# Patient Record
Sex: FEMALE | URBAN - METROPOLITAN AREA
[De-identification: names, ages, dates, MRNs, and addresses within clinical notes are randomized per-mention and may not be internally consistent; named-entity substitution may affect disease eponyms.]

---

## 2023-10-25 ENCOUNTER — OFFICE VISIT (OUTPATIENT)
Dept: URBAN - METROPOLITAN AREA CLINIC 54 | Facility: CLINIC | Age: 68
End: 2023-10-25
Payer: COMMERCIAL

## 2023-10-25 VITALS
BODY MASS INDEX: 28.42 KG/M2 | HEIGHT: 66 IN | DIASTOLIC BLOOD PRESSURE: 67 MMHG | WEIGHT: 176.8 LBS | HEART RATE: 73 BPM | SYSTOLIC BLOOD PRESSURE: 116 MMHG | TEMPERATURE: 97.9 F

## 2023-10-25 DIAGNOSIS — R74.8 ELEVATED LIVER ENZYMES: ICD-10-CM

## 2023-10-25 DIAGNOSIS — Z86.010 HISTORY OF COLON POLYPS: ICD-10-CM

## 2023-10-25 DIAGNOSIS — D69.6 THROMBOCYTOPENIA: ICD-10-CM

## 2023-10-25 DIAGNOSIS — R14.0 ABDOMINAL DISTENSION: ICD-10-CM

## 2023-10-25 DIAGNOSIS — R60.0 BILATERAL LEG EDEMA: ICD-10-CM

## 2023-10-25 PROCEDURE — 99204 OFFICE O/P NEW MOD 45 MIN: CPT

## 2023-10-25 RX ORDER — FLUOXETINE 20 MG/1
1 CAPSULE CAPSULE ORAL ONCE A DAY
Status: ACTIVE | COMMUNITY

## 2023-10-25 RX ORDER — LEVOTHYROXINE SODIUM 75 UG/1
1 TABLET IN THE MORNING ON AN EMPTY STOMACH TABLET ORAL ONCE A DAY
Status: ACTIVE | COMMUNITY

## 2023-10-25 RX ORDER — OXYMETAZOLINE HYDROCHLORIDE 0.05 MG/100ML
4 SPRAYS (2 SPRAYS IN EACH NOSTRIL) SPRAY, METERED NASAL TWICE A DAY
Status: ACTIVE | COMMUNITY

## 2023-10-25 NOTE — PHYSICAL EXAM CONSTITUTIONAL:
well developed, well nourished , in no acute distress , ambulating without difficulty , slight slurring of words

## 2023-10-25 NOTE — HPI-TODAY'S VISIT:
10/25/23: Patient is a 68 female with a PMH of HTN, DM2, hypothyroidism, and colon polyps who was self referred for colon cancer screening. Last colonoscopy was 5 years ago. Maternal aunt had colon cancer. Pt denies any abdominal pain, change in bowel habits, diarrhea, constipation, rectal bleeding, or melena, but she does complain of significant abd bloating and distension over the past few weeks. States her belly feels very swollen. Pt has also develoeped new LE edema in the last few weeks. Admits she has been having some confusion in the last 2 months along with word-finding issues. No jaundice. Pt denies any personal hx of liver disease, but was recently told her "liver was high," labs below, so she has a RUQ US schedule this Friday. No family hx of liver disease. Pt denies any current alcohol use or hx of heavy etoh consumption. No cardipulmonary disease. ER visit last month for low BP on home cuff, but BP was normal at ER with clear CXR.    Labs 9/18/23: AST 49, ALT 28, , TB 0.6, albumin 3.6, Cr 0.7, Na 136, K 3.3, Plt 120, Hgb 12.4, MCV 91, WBC 3.5, INR 1.13.

## 2023-10-28 PROBLEM — 428283002: Status: ACTIVE | Noted: 2023-10-28

## 2023-10-28 PROBLEM — 302215000: Status: ACTIVE | Noted: 2023-10-28

## 2023-10-30 ENCOUNTER — TELEPHONE ENCOUNTER (OUTPATIENT)
Dept: URBAN - METROPOLITAN AREA CLINIC 54 | Facility: CLINIC | Age: 68
End: 2023-10-30

## 2023-10-31 ENCOUNTER — TELEPHONE ENCOUNTER (OUTPATIENT)
Dept: URBAN - METROPOLITAN AREA CLINIC 54 | Facility: CLINIC | Age: 68
End: 2023-10-31

## 2023-10-31 LAB
A/G RATIO: 1.6
ABSOLUTE BASOPHILS: 22
ABSOLUTE EOSINOPHILS: 29
ABSOLUTE LYMPHOCYTES: 731
ABSOLUTE MONOCYTES: 382
ABSOLUTE NEUTROPHILS: 2437
ACTIN (SMOOTH MUSCLE) ANTIBODY (IGG): <20
ALBUMIN: 3.5
ALKALINE PHOSPHATASE: 161
ALPHA-1-ANTITRYPSIN QN: 145
ALT (SGPT): 22
AMMONIA (P): 43
ANA SCREEN, IFA: NEGATIVE
AST (SGOT): 31
BASOPHILS: 0.6
BILIRUBIN, TOTAL: 1.3
BUN/CREATININE RATIO: (no result)
BUN: 9
CALCIUM: 9
CARBON DIOXIDE, TOTAL: 26
CERULOPLASMIN: 30
CHLORIDE: 105
CREATININE: 0.82
EGFR: 77
EOSINOPHILS: 0.8
FERRITIN, SERUM: 79
GLOBULIN, TOTAL: 2.2
GLUCOSE: 123
HBSAG SCREEN: (no result)
HEMATOCRIT: 37.6
HEMOGLOBIN: 12.2
HEP A AB, IGM: (no result)
HEP B CORE AB, IGM: (no result)
HEPATITIS C ANTIBODY: (no result)
IMMUNOGLOBULIN A: 352
IMMUNOGLOBULIN G: 1012
IMMUNOGLOBULIN M: 155
INR: 1.1
IRON BIND.CAP.(TIBC): 247
IRON SATURATION: 30
IRON: 75
LYMPHOCYTES: 20.3
MCH: 29.3
MCHC: 32.4
MCV: 90.2
MITOCHONDRIAL (M2) ANTIBODY: <=20
MONOCYTES: 10.6
MPV: 13.4
NEUTROPHILS: 67.7
PLATELET COUNT: 137
POTASSIUM: 3.8
PROTEIN, TOTAL: 5.7
PT: 11.5
RDW: 14.8
RED BLOOD CELL COUNT: 4.17
RHEUMATOID FACTOR: <14
SJOGREN'S ANTIBODY (SS-A): (no result)
SJOGREN'S ANTIBODY (SS-B): (no result)
SODIUM: 140
WHITE BLOOD CELL COUNT: 3.6

## 2023-11-03 ENCOUNTER — TELEPHONE ENCOUNTER (OUTPATIENT)
Dept: URBAN - METROPOLITAN AREA CLINIC 54 | Facility: CLINIC | Age: 68
End: 2023-11-03

## 2023-11-06 ENCOUNTER — LAB OUTSIDE AN ENCOUNTER (OUTPATIENT)
Dept: URBAN - METROPOLITAN AREA CLINIC 54 | Facility: CLINIC | Age: 68
End: 2023-11-06

## 2023-11-06 ENCOUNTER — TELEPHONE ENCOUNTER (OUTPATIENT)
Dept: URBAN - METROPOLITAN AREA CLINIC 54 | Facility: CLINIC | Age: 68
End: 2023-11-06

## 2023-11-06 PROBLEM — 19943007: Status: ACTIVE | Noted: 2023-11-06

## 2023-11-06 PROBLEM — 389026000: Status: ACTIVE | Noted: 2023-11-06

## 2023-11-06 RX ORDER — LACTULOSE 10 G/15ML
15 ML AS NEEDED SOLUTION ORAL TWICE A DAY
Qty: 900 ML | Refills: 3 | OUTPATIENT
Start: 2023-11-06 | End: 2024-03-05

## 2023-11-06 RX ORDER — FUROSEMIDE 40 MG/1
1 TABLET TABLET ORAL ONCE A DAY
Qty: 30 | Refills: 2 | OUTPATIENT
Start: 2023-11-06

## 2023-11-06 RX ORDER — SPIRONOLACTONE 100 MG/1
1 TABLET TABLET, FILM COATED ORAL ONCE A DAY
Qty: 30 | Refills: 2 | OUTPATIENT
Start: 2023-11-06 | End: 2024-02-04

## 2023-11-13 ENCOUNTER — LAB OUTSIDE AN ENCOUNTER (OUTPATIENT)
Dept: URBAN - METROPOLITAN AREA CLINIC 54 | Facility: CLINIC | Age: 68
End: 2023-11-13

## 2023-11-27 ENCOUNTER — OFFICE VISIT (OUTPATIENT)
Dept: URBAN - METROPOLITAN AREA CLINIC 54 | Facility: CLINIC | Age: 68
End: 2023-11-27
Payer: COMMERCIAL

## 2023-11-27 VITALS
DIASTOLIC BLOOD PRESSURE: 64 MMHG | SYSTOLIC BLOOD PRESSURE: 97 MMHG | BODY MASS INDEX: 23.95 KG/M2 | HEART RATE: 78 BPM | TEMPERATURE: 97.7 F | WEIGHT: 149 LBS | HEIGHT: 66 IN

## 2023-11-27 DIAGNOSIS — K86.9 PANCREATIC LESION: ICD-10-CM

## 2023-11-27 DIAGNOSIS — K76.82 HEPATIC ENCEPHALOPATHY: ICD-10-CM

## 2023-11-27 DIAGNOSIS — R18.8 OTHER ASCITES: ICD-10-CM

## 2023-11-27 DIAGNOSIS — K74.60 UNSPECIFIED CIRRHOSIS OF LIVER: ICD-10-CM

## 2023-11-27 PROCEDURE — 99214 OFFICE O/P EST MOD 30 MIN: CPT

## 2023-11-27 RX ORDER — LACTULOSE 10 G/15ML
15 ML AS NEEDED SOLUTION ORAL TWICE A DAY
Qty: 900 ML | Refills: 3 | Status: ACTIVE | COMMUNITY
Start: 2023-11-06 | End: 2024-03-05

## 2023-11-27 RX ORDER — SPIRONOLACTONE 100 MG/1
1 TABLET TABLET, FILM COATED ORAL ONCE A DAY
Qty: 30 | Refills: 2 | Status: ACTIVE | COMMUNITY
Start: 2023-11-06 | End: 2024-02-04

## 2023-11-27 RX ORDER — LEVOTHYROXINE SODIUM 75 UG/1
1 TABLET IN THE MORNING ON AN EMPTY STOMACH TABLET ORAL ONCE A DAY
Status: ACTIVE | COMMUNITY

## 2023-11-27 RX ORDER — FLUOXETINE 20 MG/1
1 CAPSULE CAPSULE ORAL ONCE A DAY
Status: ACTIVE | COMMUNITY

## 2023-11-27 RX ORDER — SPIRONOLACTONE 100 MG/1
1 TABLET TABLET, FILM COATED ORAL ONCE A DAY
OUTPATIENT
Start: 2023-11-06 | End: 2024-02-04

## 2023-11-27 RX ORDER — LACTULOSE 10 G/15ML
30 ML SOLUTION ORAL
OUTPATIENT
Start: 2023-11-06 | End: 2023-12-27

## 2023-11-27 RX ORDER — FUROSEMIDE 20 MG/1
1 TABLET TABLET ORAL ONCE A DAY
Qty: 30 | Refills: 2 | OUTPATIENT
Start: 2023-11-27

## 2023-11-27 RX ORDER — FUROSEMIDE 40 MG/1
1 TABLET TABLET ORAL ONCE A DAY
Qty: 30 | Refills: 2 | Status: ACTIVE | COMMUNITY
Start: 2023-11-06

## 2023-11-27 RX ORDER — FUROSEMIDE 40 MG/1
1 TABLET TABLET ORAL ONCE A DAY
OUTPATIENT
Start: 2023-11-06

## 2023-11-27 RX ORDER — SPIRONOLACTONE 50 MG/1
1 TABLET TABLET, FILM COATED ORAL ONCE A DAY
Qty: 30 | Refills: 2 | OUTPATIENT
Start: 2023-11-27 | End: 2024-02-25

## 2023-11-27 RX ORDER — OXYMETAZOLINE HYDROCHLORIDE 0.05 MG/100ML
4 SPRAYS (2 SPRAYS IN EACH NOSTRIL) SPRAY, METERED NASAL TWICE A DAY
Status: ACTIVE | COMMUNITY

## 2023-11-27 NOTE — HPI-TODAY'S VISIT:
10/25/23: Patient is a 68 female with a PMH of HTN, DM2, hypothyroidism, and colon polyps who was self referred for colon cancer screening. Last colonoscopy was 5 years ago. Maternal aunt had colon cancer. Pt denies any abdominal pain, change in bowel habits, diarrhea, constipation, rectal bleeding, or melena, but she does complain of significant abd bloating and distension over the past few weeks. States her belly feels very swollen. Pt has also develoeped new LE edema in the last few weeks. Admits she has been having some confusion in the last 2 months along with word-finding issues. No jaundice. Pt denies any personal hx of liver disease, but was recently told her "liver was high," labs below, so she has a RUQ US schedule this Friday. No family hx of liver disease. Pt denies any current alcohol use or hx of heavy etoh consumption. No cardipulmonary disease. ER visit last month for low BP on home cuff, but BP was normal at ER with clear CXR.    Labs 9/18/23: AST 49, ALT 28, , TB 0.6, albumin 3.6, Cr 0.7, Na 136, K 3.3, Plt 120, Hgb 12.4, MCV 91, WBC 3.5, INR 1.13.  11/27/23: Pt returns for follow up following labs and US which revealed cirrhosis with small volume ascites. Negative secondary workup, likely secondary to NAFLD. MELD 3.0 was 9. Pt was started on A100, L40 and ascites and LE edema has resolved. However pt has lost 28 lbs in the last month (+3 lbs today). Taking lactulose 15mL BID but only having a BM every few days. Continues to have confusion and word finding issues. Complains of significant fatigue. Has MRI/MRCP scheduled next week to evaluate pancreatic head lesion. Due for cscope, needs EGD.   RUQ US 11/6/2023:  - Cirrhotic hepatic morphology with small volume abdominal ascites. - Cystic lesion in the region of the pancreatic head measuring up to 1.8 cm. MRCP could better evaluate.

## 2023-11-28 ENCOUNTER — TELEPHONE ENCOUNTER (OUTPATIENT)
Dept: URBAN - METROPOLITAN AREA CLINIC 54 | Facility: CLINIC | Age: 68
End: 2023-11-28

## 2023-11-28 LAB
AFP, SERUM, TUMOR MARKER: 6.1
BUN/CREATININE RATIO: 20
CALCIUM: 10
CARBON DIOXIDE: 28
CHLORIDE: 98
CREATININE: 1.02
EGFR: 59
GLUCOSE: 98
POTASSIUM: 4
SODIUM: 135
UREA NITROGEN (BUN): 20

## 2023-12-01 ENCOUNTER — ERX REFILL RESPONSE (OUTPATIENT)
Dept: URBAN - METROPOLITAN AREA CLINIC 54 | Facility: CLINIC | Age: 68
End: 2023-12-01

## 2023-12-01 RX ORDER — FUROSEMIDE 40 MG/1
TAKE 1 TABLET BY MOUTH EVERY DAY FOR 30 DAYS TABLET ORAL
Qty: 90 TABLET | Refills: 1 | OUTPATIENT

## 2023-12-12 ENCOUNTER — TELEPHONE ENCOUNTER (OUTPATIENT)
Dept: URBAN - METROPOLITAN AREA CLINIC 54 | Facility: CLINIC | Age: 68
End: 2023-12-12

## 2023-12-13 ENCOUNTER — TELEPHONE ENCOUNTER (OUTPATIENT)
Dept: URBAN - METROPOLITAN AREA CLINIC 54 | Facility: CLINIC | Age: 68
End: 2023-12-13

## 2023-12-14 ENCOUNTER — LAB OUTSIDE AN ENCOUNTER (OUTPATIENT)
Dept: URBAN - METROPOLITAN AREA CLINIC 54 | Facility: CLINIC | Age: 68
End: 2023-12-14

## 2023-12-14 ENCOUNTER — TELEPHONE ENCOUNTER (OUTPATIENT)
Dept: URBAN - METROPOLITAN AREA CLINIC 54 | Facility: CLINIC | Age: 68
End: 2023-12-14

## 2023-12-14 RX ORDER — SODIUM, POTASSIUM,MAG SULFATES 17.5-3.13G
354 ML AS DIRECTED SOLUTION, RECONSTITUTED, ORAL ORAL
Qty: 354 ML | Refills: 0 | OUTPATIENT
Start: 2023-12-14 | End: 2023-12-15

## 2023-12-19 ENCOUNTER — ERX REFILL RESPONSE (OUTPATIENT)
Dept: URBAN - METROPOLITAN AREA CLINIC 54 | Facility: CLINIC | Age: 68
End: 2023-12-19

## 2023-12-19 RX ORDER — LACTULOSE 10 G/15ML
30 ML SOLUTION ORAL
OUTPATIENT

## 2023-12-19 RX ORDER — LACTULOSE 10 G/15ML
30 ML SOLUTION ORAL
Qty: 1800 | Refills: 5 | OUTPATIENT

## 2023-12-21 ENCOUNTER — ERX REFILL RESPONSE (OUTPATIENT)
Dept: URBAN - METROPOLITAN AREA CLINIC 54 | Facility: CLINIC | Age: 68
End: 2023-12-21

## 2023-12-21 RX ORDER — FUROSEMIDE 20 MG/1
TAKE 1 TABLET BY MOUTH EVERY DAY FOR 30 DAYS TABLET ORAL
Qty: 90 TABLET | Refills: 1 | OUTPATIENT

## 2023-12-21 RX ORDER — SPIRONOLACTONE 50 MG/1
TAKE 1 TABLET BY MOUTH EVERY DAY FOR 30 DAYS TABLET ORAL
Qty: 90 TABLET | Refills: 1 | OUTPATIENT

## 2023-12-21 RX ORDER — SPIRONOLACTONE 50 MG/1
1 TABLET TABLET, FILM COATED ORAL ONCE A DAY
Qty: 30 | Refills: 2 | OUTPATIENT

## 2023-12-21 RX ORDER — FUROSEMIDE 20 MG/1
1 TABLET TABLET ORAL ONCE A DAY
Qty: 30 | Refills: 2 | OUTPATIENT

## 2023-12-22 LAB — CA 19-9: 29

## 2023-12-27 ENCOUNTER — TELEPHONE ENCOUNTER (OUTPATIENT)
Dept: URBAN - METROPOLITAN AREA CLINIC 54 | Facility: CLINIC | Age: 68
End: 2023-12-27

## 2024-01-02 ENCOUNTER — TELEPHONE ENCOUNTER (OUTPATIENT)
Dept: URBAN - METROPOLITAN AREA CLINIC 54 | Facility: CLINIC | Age: 69
End: 2024-01-02

## 2024-03-27 ENCOUNTER — COL/EGD (OUTPATIENT)
Dept: URBAN - METROPOLITAN AREA MEDICAL CENTER 24 | Facility: MEDICAL CENTER | Age: 69
End: 2024-03-27

## 2024-03-27 RX ORDER — OXYMETAZOLINE HYDROCHLORIDE 0.05 MG/100ML
4 SPRAYS (2 SPRAYS IN EACH NOSTRIL) SPRAY, METERED NASAL TWICE A DAY
Status: ACTIVE | COMMUNITY

## 2024-03-27 RX ORDER — LACTULOSE 10 G/15ML
30 ML SOLUTION ORAL
Qty: 1800 | Refills: 5 | Status: ACTIVE | COMMUNITY

## 2024-03-27 RX ORDER — FUROSEMIDE 40 MG/1
TAKE 1 TABLET BY MOUTH EVERY DAY FOR 30 DAYS TABLET ORAL
Qty: 90 TABLET | Refills: 1 | Status: ACTIVE | COMMUNITY

## 2024-03-27 RX ORDER — FUROSEMIDE 20 MG/1
TAKE 1 TABLET BY MOUTH EVERY DAY FOR 30 DAYS TABLET ORAL
Qty: 90 TABLET | Refills: 1 | Status: ACTIVE | COMMUNITY

## 2024-03-27 RX ORDER — SPIRONOLACTONE 50 MG/1
TAKE 1 TABLET BY MOUTH EVERY DAY FOR 30 DAYS TABLET ORAL
Qty: 90 TABLET | Refills: 1 | Status: ACTIVE | COMMUNITY

## 2024-03-27 RX ORDER — LEVOTHYROXINE SODIUM 75 UG/1
1 TABLET IN THE MORNING ON AN EMPTY STOMACH TABLET ORAL ONCE A DAY
Status: ACTIVE | COMMUNITY

## 2024-03-27 RX ORDER — FLUOXETINE 20 MG/1
1 CAPSULE CAPSULE ORAL ONCE A DAY
Status: ACTIVE | COMMUNITY

## 2024-04-16 ENCOUNTER — LAB (OUTPATIENT)
Dept: URBAN - METROPOLITAN AREA CLINIC 54 | Facility: CLINIC | Age: 69
End: 2024-04-16

## 2024-04-16 ENCOUNTER — OV EP (OUTPATIENT)
Dept: URBAN - METROPOLITAN AREA CLINIC 54 | Facility: CLINIC | Age: 69
End: 2024-04-16
Payer: COMMERCIAL

## 2024-04-16 VITALS
TEMPERATURE: 97.3 F | BODY MASS INDEX: 25.23 KG/M2 | HEIGHT: 66 IN | HEART RATE: 66 BPM | WEIGHT: 157 LBS | DIASTOLIC BLOOD PRESSURE: 64 MMHG | SYSTOLIC BLOOD PRESSURE: 94 MMHG

## 2024-04-16 DIAGNOSIS — Z86.010 PERSONAL HISTORY OF COLONIC POLYPS: ICD-10-CM

## 2024-04-16 DIAGNOSIS — K59.00 CONSTIPATION, UNSPECIFIED CONSTIPATION TYPE: ICD-10-CM

## 2024-04-16 DIAGNOSIS — K76.82 HEPATIC ENCEPHALOPATHY: ICD-10-CM

## 2024-04-16 DIAGNOSIS — K76.6 PORTAL HYPERTENSION: ICD-10-CM

## 2024-04-16 DIAGNOSIS — R18.8 OTHER ASCITES: ICD-10-CM

## 2024-04-16 DIAGNOSIS — K86.9 PANCREATIC LESION: ICD-10-CM

## 2024-04-16 DIAGNOSIS — I85.00 ESOPHAGEAL VARICES WITHOUT BLEEDING, UNSPECIFIED ESOPHAGEAL VARICES TYPE: ICD-10-CM

## 2024-04-16 DIAGNOSIS — K74.60 UNSPECIFIED CIRRHOSIS OF LIVER: ICD-10-CM

## 2024-04-16 PROBLEM — 14223005: Status: ACTIVE | Noted: 2024-04-16

## 2024-04-16 PROBLEM — 34742003: Status: ACTIVE | Noted: 2024-04-16

## 2024-04-16 PROBLEM — 14760008: Status: ACTIVE | Noted: 2024-04-16

## 2024-04-16 PROBLEM — 428283002: Status: ACTIVE | Noted: 2024-04-16

## 2024-04-16 PROCEDURE — 99214 OFFICE O/P EST MOD 30 MIN: CPT | Performed by: PHYSICIAN ASSISTANT

## 2024-04-16 RX ORDER — CARVEDILOL 3.12 MG/1
1 TABLET WITH FOOD TABLET, FILM COATED ORAL TWICE A DAY
Qty: 60 | Refills: 3 | Status: ACTIVE | COMMUNITY
Start: 2024-03-27

## 2024-04-16 RX ORDER — FUROSEMIDE 20 MG/1
TAKE 1 TABLET BY MOUTH EVERY DAY FOR 30 DAYS TABLET ORAL
Qty: 90 TABLET | Refills: 1 | Status: ACTIVE | COMMUNITY

## 2024-04-16 RX ORDER — SPIRONOLACTONE 50 MG/1
TAKE 1 TABLET BY MOUTH EVERY DAY FOR 30 DAYS TABLET ORAL
Qty: 90 TABLET | Refills: 1

## 2024-04-16 RX ORDER — LEVOTHYROXINE SODIUM 75 UG/1
1 TABLET IN THE MORNING ON AN EMPTY STOMACH TABLET ORAL ONCE A DAY
Status: ACTIVE | COMMUNITY

## 2024-04-16 RX ORDER — LACTULOSE 10 G/15ML
30 ML SOLUTION ORAL
Qty: 1800 | Refills: 5 | Status: ACTIVE | COMMUNITY

## 2024-04-16 RX ORDER — SPIRONOLACTONE 50 MG/1
TAKE 1 TABLET BY MOUTH EVERY DAY FOR 30 DAYS TABLET ORAL
Qty: 90 TABLET | Refills: 1 | Status: ACTIVE | COMMUNITY

## 2024-04-16 RX ORDER — OXYMETAZOLINE HYDROCHLORIDE 0.05 MG/100ML
4 SPRAYS (2 SPRAYS IN EACH NOSTRIL) SPRAY, METERED NASAL TWICE A DAY
Status: ACTIVE | COMMUNITY

## 2024-04-16 RX ORDER — FUROSEMIDE 40 MG/1
TAKE 1 TABLET BY MOUTH EVERY DAY FOR 30 DAYS TABLET ORAL
Qty: 90 TABLET | Refills: 1 | Status: ACTIVE | COMMUNITY

## 2024-04-16 RX ORDER — FUROSEMIDE 20 MG/1
1 TABLET ORALLY ONCE A DAY TABLET ORAL
Qty: 30 | Refills: 5 | OUTPATIENT

## 2024-04-16 RX ORDER — FLUOXETINE 20 MG/1
1 CAPSULE CAPSULE ORAL ONCE A DAY
Status: ACTIVE | COMMUNITY

## 2024-04-16 NOTE — HPI-TODAY'S VISIT:
10/25/23: Patient is a 68 female with a PMH of HTN, DM2, hypothyroidism, and colon polyps who was self referred for colon cancer screening. Last colonoscopy was 5 years ago. Maternal aunt had colon cancer. Pt denies any abdominal pain, change in bowel habits, diarrhea, constipation, rectal bleeding, or melena, but she does complain of significant abd bloating and distension over the past few weeks. States her belly feels very swollen. Pt has also develoeped new LE edema in the last few weeks. Admits she has been having some confusion in the last 2 months along with word-finding issues. No jaundice. Pt denies any personal hx of liver disease, but was recently told her "liver was high," labs below, so she has a RUQ US schedule this Friday. No family hx of liver disease. Pt denies any current alcohol use or hx of heavy etoh consumption. No cardipulmonary disease. ER visit last month for low BP on home cuff, but BP was normal at ER with clear CXR.    Labs 9/18/23: AST 49, ALT 28, , TB 0.6, albumin 3.6, Cr 0.7, Na 136, K 3.3, Plt 120, Hgb 12.4, MCV 91, WBC 3.5, INR 1.13.  11/27/23: Pt returns for follow up following labs and US which revealed cirrhosis with small volume ascites. Negative secondary workup, likely secondary to NAFLD. MELD 3.0 was 9. Pt was started on A100, L40 and ascites and LE edema has resolved. However pt has lost 28 lbs in the last month (+3 lbs today). Taking lactulose 15mL BID but only having a BM every few days. Continues to have confusion and word finding issues. Complains of significant fatigue. Has MRI/MRCP scheduled next week to evaluate pancreatic head lesion. Due for cscope, needs EGD.   RUQ US 11/6/2023:  - Cirrhotic hepatic morphology with small volume abdominal ascites. - Cystic lesion in the region of the pancreatic head measuring up to 1.8 cm. MRCP could better evaluate.  4/16/24: Patient presents for routine follow-up.  She underwent EGD and colonoscopy with Dr. Larsen on 3/27/2024.  EGD showed small EV and 1 medium sized varix at 25 cm.  No high risk stigmata.  Portal hypertensive gastropathy s/p biopsy.  Started Coreg 3.15 mg p.o. twice daily.  Repeat EGD in 1 year for surveillance.  The showed 5 mm polyp, diverticulosis in the sigmoid, descending, and transverse colon.  External and internal hemorrhoids.  Gastric biopsies showed mild chronic gastritis and negative for H. pylori and intestinal metaplasia.  Colon polyp consistent with fragments of TA.  Recommended repeat colonoscopy in 7-10 years. She is almost due for repeat MRI imaging for surveillance of pancreatic cysts.  Negative CA 19-9. She reports confusion but normal ammonia previously. Patient's primary complaint is constipation despite lactulose 20 g TID. Remains on A50 L20 without ascites.

## 2024-04-17 LAB
A/G RATIO: 1.4
ABSOLUTE BASOPHILS: 30
ABSOLUTE EOSINOPHILS: 40
ABSOLUTE LYMPHOCYTES: 1036
ABSOLUTE MONOCYTES: 475
ABSOLUTE NEUTROPHILS: 1719
AFP, SERUM, TUMOR MARKER: 5.6
ALBUMIN: 3.6
ALKALINE PHOSPHATASE: 141
ALT (SGPT): 24
AST (SGOT): 29
BASOPHILS: 0.9
BILIRUBIN, TOTAL: 0.8
BUN/CREATININE RATIO: (no result)
BUN: 12
CALCIUM: 9.2
CARBON DIOXIDE, TOTAL: 30
CHLORIDE: 102
CREATININE: 0.96
EGFR: 64
EOSINOPHILS: 1.2
GLOBULIN, TOTAL: 2.6
GLUCOSE: 102
HEMATOCRIT: 37.9
HEMOGLOBIN: 13.1
INR: 1.1
LYMPHOCYTES: 31.4
MCH: 30.3
MCHC: 34.6
MCV: 87.7
MONOCYTES: 14.4
MPV: 14.1
NEUTROPHILS: 52.1
PLATELET COUNT: 109
POTASSIUM: 3.7
PROTEIN, TOTAL: 6.2
PT: 11.2
RDW: 14.4
RED BLOOD CELL COUNT: 4.32
SODIUM: 138
WHITE BLOOD CELL COUNT: 3.3

## 2024-05-09 ENCOUNTER — DASHBOARD ENCOUNTERS (OUTPATIENT)
Age: 69
End: 2024-05-09

## 2024-06-07 ENCOUNTER — TELEPHONE ENCOUNTER (OUTPATIENT)
Dept: URBAN - METROPOLITAN AREA CLINIC 54 | Facility: CLINIC | Age: 69
End: 2024-06-07

## 2024-07-21 ENCOUNTER — ERX REFILL RESPONSE (OUTPATIENT)
Dept: URBAN - METROPOLITAN AREA CLINIC 54 | Facility: CLINIC | Age: 69
End: 2024-07-21

## 2024-07-21 RX ORDER — CARVEDILOL 3.12 MG/1
1 TABLET WITH FOOD TABLET, FILM COATED ORAL TWICE A DAY
Qty: 60 | Refills: 3 | OUTPATIENT

## 2024-10-15 ENCOUNTER — OFFICE VISIT (OUTPATIENT)
Dept: URBAN - METROPOLITAN AREA CLINIC 54 | Facility: CLINIC | Age: 69
End: 2024-10-15

## 2024-10-17 ENCOUNTER — ERX REFILL RESPONSE (OUTPATIENT)
Dept: URBAN - METROPOLITAN AREA CLINIC 54 | Facility: CLINIC | Age: 69
End: 2024-10-17

## 2024-10-17 RX ORDER — CARVEDILOL 3.12 MG/1
1 TABLET WITH FOOD TABLET, FILM COATED ORAL TWICE A DAY
Qty: 60 | Refills: 3 | OUTPATIENT

## 2024-10-17 RX ORDER — CARVEDILOL 3.12 MG/1
TAKE 1 TABLET BY MOUTH TWICE A DAY WITH FOOD FOR 30 DAYS TABLET, FILM COATED ORAL
Qty: 180 TABLET | Refills: 1 | OUTPATIENT

## 2024-10-21 ENCOUNTER — P2P PATIENT RECORD (OUTPATIENT)
Age: 69
End: 2024-10-21

## 2025-01-06 ENCOUNTER — OFFICE VISIT (OUTPATIENT)
Dept: URBAN - METROPOLITAN AREA CLINIC 54 | Facility: CLINIC | Age: 70
End: 2025-01-06

## 2025-01-06 RX ORDER — FUROSEMIDE 20 MG/1
TAKE 1 TABLET BY MOUTH EVERY DAY FOR 30 DAYS TABLET ORAL
Qty: 90 TABLET | Refills: 1 | COMMUNITY

## 2025-01-06 RX ORDER — LACTULOSE 10 G/15ML
30 ML SOLUTION ORAL
Qty: 1800 | Refills: 5 | COMMUNITY

## 2025-01-06 RX ORDER — FLUOXETINE 20 MG/1
1 CAPSULE CAPSULE ORAL ONCE A DAY
COMMUNITY

## 2025-01-06 RX ORDER — OXYMETAZOLINE HYDROCHLORIDE 0.05 MG/100ML
4 SPRAYS (2 SPRAYS IN EACH NOSTRIL) SPRAY, METERED NASAL TWICE A DAY
COMMUNITY

## 2025-01-06 RX ORDER — LEVOTHYROXINE SODIUM 75 UG/1
1 TABLET IN THE MORNING ON AN EMPTY STOMACH TABLET ORAL ONCE A DAY
COMMUNITY

## 2025-01-06 RX ORDER — FUROSEMIDE 40 MG/1
TAKE 1 TABLET BY MOUTH EVERY DAY FOR 30 DAYS TABLET ORAL
Qty: 90 TABLET | Refills: 1 | COMMUNITY

## 2025-01-06 RX ORDER — SPIRONOLACTONE 50 MG/1
TAKE 1 TABLET BY MOUTH EVERY DAY FOR 30 DAYS TABLET ORAL
Qty: 90 TABLET | Refills: 1 | COMMUNITY

## 2025-01-06 RX ORDER — CARVEDILOL 3.12 MG/1
TAKE 1 TABLET BY MOUTH TWICE A DAY WITH FOOD FOR 30 DAYS TABLET, FILM COATED ORAL
Qty: 180 TABLET | Refills: 1 | COMMUNITY

## 2025-01-06 RX ORDER — FUROSEMIDE 20 MG/1
1 TABLET ORALLY ONCE A DAY TABLET ORAL
Qty: 30 | Refills: 5 | COMMUNITY

## 2025-01-09 ENCOUNTER — TELEPHONE ENCOUNTER (OUTPATIENT)
Dept: URBAN - METROPOLITAN AREA CLINIC 54 | Facility: CLINIC | Age: 70
End: 2025-01-09

## 2025-01-09 RX ORDER — SPIRONOLACTONE 50 MG/1
TAKE 1 TABLET BY MOUTH EVERY DAY TABLET ORAL
Qty: 90 | Refills: 1
End: 2025-07-08

## 2025-01-29 ENCOUNTER — OFFICE VISIT (OUTPATIENT)
Dept: URBAN - METROPOLITAN AREA CLINIC 54 | Facility: CLINIC | Age: 70
End: 2025-01-29
Payer: MEDICARE

## 2025-01-29 ENCOUNTER — LAB OUTSIDE AN ENCOUNTER (OUTPATIENT)
Dept: URBAN - METROPOLITAN AREA CLINIC 54 | Facility: CLINIC | Age: 70
End: 2025-01-29

## 2025-01-29 VITALS
SYSTOLIC BLOOD PRESSURE: 106 MMHG | BODY MASS INDEX: 25.71 KG/M2 | HEART RATE: 57 BPM | HEIGHT: 66 IN | WEIGHT: 160 LBS | TEMPERATURE: 98.3 F | DIASTOLIC BLOOD PRESSURE: 63 MMHG

## 2025-01-29 DIAGNOSIS — K76.82 HEPATIC ENCEPHALOPATHY: ICD-10-CM

## 2025-01-29 DIAGNOSIS — K59.00 CONSTIPATION, UNSPECIFIED CONSTIPATION TYPE: ICD-10-CM

## 2025-01-29 DIAGNOSIS — K74.60 UNSPECIFIED CIRRHOSIS OF LIVER: ICD-10-CM

## 2025-01-29 DIAGNOSIS — K86.9 PANCREATIC LESION: ICD-10-CM

## 2025-01-29 DIAGNOSIS — K76.6 PORTAL HYPERTENSION: ICD-10-CM

## 2025-01-29 DIAGNOSIS — R18.8 OTHER ASCITES: ICD-10-CM

## 2025-01-29 DIAGNOSIS — I85.00 ESOPHAGEAL VARICES WITHOUT BLEEDING, UNSPECIFIED ESOPHAGEAL VARICES TYPE: ICD-10-CM

## 2025-01-29 DIAGNOSIS — Z86.0101 H/O ADENOMATOUS POLYP OF COLON: ICD-10-CM

## 2025-01-29 PROCEDURE — 99214 OFFICE O/P EST MOD 30 MIN: CPT

## 2025-01-29 RX ORDER — FUROSEMIDE 20 MG/1
1 TABLET ORALLY ONCE A DAY TABLET ORAL
Qty: 90 | Refills: 1

## 2025-01-29 RX ORDER — LEVOTHYROXINE SODIUM 75 UG/1
1 TABLET IN THE MORNING ON AN EMPTY STOMACH TABLET ORAL ONCE A DAY
Status: ACTIVE | COMMUNITY

## 2025-01-29 RX ORDER — FLUOXETINE 20 MG/1
1 CAPSULE CAPSULE ORAL ONCE A DAY
Status: ACTIVE | COMMUNITY

## 2025-01-29 RX ORDER — OXYMETAZOLINE HYDROCHLORIDE 0.05 MG/100ML
4 SPRAYS (2 SPRAYS IN EACH NOSTRIL) SPRAY, METERED NASAL TWICE A DAY
Status: ACTIVE | COMMUNITY

## 2025-01-29 RX ORDER — CARVEDILOL 3.12 MG/1
1 TABLET WITH FOOD TABLET, FILM COATED ORAL TWICE A DAY
Qty: 180 TABLET | Refills: 1

## 2025-01-29 RX ORDER — SPIRONOLACTONE 50 MG/1
TAKE 1 TABLET BY MOUTH EVERY DAY TABLET ORAL
Qty: 90 | Refills: 1 | Status: ACTIVE | COMMUNITY
End: 2025-07-08

## 2025-01-29 RX ORDER — RIFAXIMIN 550 MG/1
1 TABLET TABLET ORAL TWICE A DAY
Qty: 180 TABLET | Refills: 1 | OUTPATIENT
Start: 2025-01-29 | End: 2025-07-28

## 2025-01-29 RX ORDER — LACTULOSE 10 G/15ML
30 ML SOLUTION ORAL
Qty: 1800 | Refills: 5 | Status: ON HOLD | COMMUNITY

## 2025-01-29 RX ORDER — LACTULOSE 10 G/15ML
30 ML SOLUTION ORAL
Qty: 1800 | Refills: 5
End: 2025-07-28

## 2025-01-29 RX ORDER — CARVEDILOL 3.12 MG/1
TAKE 1 TABLET BY MOUTH TWICE A DAY WITH FOOD FOR 30 DAYS TABLET, FILM COATED ORAL
Qty: 180 TABLET | Refills: 1 | Status: ACTIVE | COMMUNITY

## 2025-01-29 RX ORDER — FUROSEMIDE 20 MG/1
1 TABLET ORALLY ONCE A DAY TABLET ORAL
Qty: 30 | Refills: 5 | Status: ACTIVE | COMMUNITY

## 2025-01-29 RX ORDER — SPIRONOLACTONE 50 MG/1
1 TABLET ORALLY ONCE A DAY TABLET ORAL
Qty: 90 | Refills: 1 | OUTPATIENT

## 2025-01-29 NOTE — PHYSICAL EXAM NEUROLOGIC:
oriented to person, place and time , normal sensation , short and long term memory intact. No asterixis

## 2025-01-29 NOTE — HPI-TODAY'S VISIT:
10/25/23: Patient is a 68 female with a PMH of HTN, DM2, hypothyroidism, and colon polyps who was self referred for colon cancer screening. Last colonoscopy was 5 years ago. Maternal aunt had colon cancer. Pt denies any abdominal pain, change in bowel habits, diarrhea, constipation, rectal bleeding, or melena, but she does complain of significant abd bloating and distension over the past few weeks. States her belly feels very swollen. Pt has also develoeped new LE edema in the last few weeks. Admits she has been having some confusion in the last 2 months along with word-finding issues. No jaundice. Pt denies any personal hx of liver disease, but was recently told her "liver was high," labs below, so she has a RUQ US schedule this Friday. No family hx of liver disease. Pt denies any current alcohol use or hx of heavy etoh consumption. No cardipulmonary disease. ER visit last month for low BP on home cuff, but BP was normal at ER with clear CXR.    Labs 9/18/23: AST 49, ALT 28, , TB 0.6, albumin 3.6, Cr 0.7, Na 136, K 3.3, Plt 120, Hgb 12.4, MCV 91, WBC 3.5, INR 1.13.  11/27/23: Pt returns for follow up following labs and US which revealed cirrhosis with small volume ascites. Negative secondary workup, likely secondary to NAFLD. MELD 3.0 was 9. Pt was started on A100, L40 and ascites and LE edema has resolved. However pt has lost 28 lbs in the last month (+3 lbs today). Taking lactulose 15mL BID but only having a BM every few days. Continues to have confusion and word finding issues. Complains of significant fatigue. Has MRI/MRCP scheduled next week to evaluate pancreatic head lesion. Due for cscope, needs EGD.   RUQ US 11/6/2023:  - Cirrhotic hepatic morphology with small volume abdominal ascites. - Cystic lesion in the region of the pancreatic head measuring up to 1.8 cm. MRCP could better evaluate.  4/16/24: Patient presents for routine follow-up.  She underwent EGD and colonoscopy with Dr. Larsen on 3/27/2024.  EGD showed small EV and 1 medium sized varix at 25 cm.  No high risk stigmata.  Portal hypertensive gastropathy s/p biopsy.  Started Coreg 3.15 mg p.o. twice daily.  Repeat EGD in 1 year for surveillance.  The showed 5 mm polyp, diverticulosis in the sigmoid, descending, and transverse colon.  External and internal hemorrhoids.  Gastric biopsies showed mild chronic gastritis and negative for H. pylori and intestinal metaplasia.  Colon polyp consistent with fragments of TA.  Recommended repeat colonoscopy in 7-10 years. She is almost due for repeat MRI imaging for surveillance of pancreatic cysts.  Negative CA 19-9. She reports confusion but normal ammonia previously. Patient's primary complaint is constipation despite lactulose 20 g TID. Remains on A50 L20 without ascites.  1/29/25 Follow Up: Patient returns for routine follow up. Labs at last visit showed slight dip in Plt count from 137 to 109, stable MELD 3.0 at 8. MRI/MRCP in May 2024 showed cirrhosis with PHTN including splenomegaly and upper abdominal varices, but no suspicious liver lesions. Unchanged 1.4 cm cystic lesion in the pancreatic neck without abnormal enhancement or suspicious internal mural nodularity. Due for one year EV surveillance this March. Pt had to switch PCPs due to insurance so is not sure which meds she is taking. On carvedilol and  A50 but may have stopped L20. Out of lacutlose. Notes worsened constipation. Significant brain fog.

## 2025-01-30 ENCOUNTER — TELEPHONE ENCOUNTER (OUTPATIENT)
Dept: URBAN - METROPOLITAN AREA CLINIC 54 | Facility: CLINIC | Age: 70
End: 2025-01-30

## 2025-01-30 LAB
A/G RATIO: 1.6
ABSOLUTE BASOPHILS: 59
ABSOLUTE EOSINOPHILS: 88
ABSOLUTE LYMPHOCYTES: 1686
ABSOLUTE MONOCYTES: 701
ABSOLUTE NEUTROPHILS: 2367
AFP, SERUM, TUMOR MARKER: 6.3
ALBUMIN: 3.8
ALKALINE PHOSPHATASE: 126
ALT (SGPT): 25
AST (SGOT): 36
BASOPHILS: 1.2
BILIRUBIN, TOTAL: 0.7
BUN/CREATININE RATIO: (no result)
BUN: 13
CALCIUM: 9.3
CARBON DIOXIDE, TOTAL: 30
CHLORIDE: 102
CREATININE: 0.99
EGFR: 61
EOSINOPHILS: 1.8
GLOBULIN, TOTAL: 2.4
GLUCOSE: 78
HEMATOCRIT: 40.9
HEMOGLOBIN: 13.6
INR: 1.1
LYMPHOCYTES: 34.4
MCH: 30.4
MCHC: 33.3
MCV: 91.3
MONOCYTES: 14.3
MPV: (no result)
NEUTROPHILS: 48.3
PLATELET COUNT: 138
POTASSIUM: 4.5
PROTEIN, TOTAL: 6.2
PT: 11.4
RDW: 14.9
RED BLOOD CELL COUNT: 4.48
SODIUM: 138
WHITE BLOOD CELL COUNT: 4.9

## 2025-01-31 ENCOUNTER — TELEPHONE ENCOUNTER (OUTPATIENT)
Dept: URBAN - METROPOLITAN AREA CLINIC 54 | Facility: CLINIC | Age: 70
End: 2025-01-31

## 2025-02-04 ENCOUNTER — TELEPHONE ENCOUNTER (OUTPATIENT)
Dept: URBAN - METROPOLITAN AREA CLINIC 54 | Facility: CLINIC | Age: 70
End: 2025-02-04

## 2025-02-05 ENCOUNTER — TELEPHONE ENCOUNTER (OUTPATIENT)
Dept: URBAN - METROPOLITAN AREA CLINIC 54 | Facility: CLINIC | Age: 70
End: 2025-02-05

## 2025-02-07 ENCOUNTER — TELEPHONE ENCOUNTER (OUTPATIENT)
Dept: URBAN - METROPOLITAN AREA CLINIC 54 | Facility: CLINIC | Age: 70
End: 2025-02-07

## 2025-03-05 ENCOUNTER — OFFICE VISIT (OUTPATIENT)
Dept: URBAN - METROPOLITAN AREA CLINIC 53 | Facility: CLINIC | Age: 70
End: 2025-03-05
Payer: MEDICARE

## 2025-03-05 DIAGNOSIS — K83.8 COMMON BILE DUCT DILATATION: ICD-10-CM

## 2025-03-05 DIAGNOSIS — K76.89 LIVER CYST: ICD-10-CM

## 2025-03-05 PROCEDURE — 76705 ECHO EXAM OF ABDOMEN: CPT | Performed by: INTERNAL MEDICINE

## 2025-03-10 ENCOUNTER — TELEPHONE ENCOUNTER (OUTPATIENT)
Dept: URBAN - METROPOLITAN AREA CLINIC 54 | Facility: CLINIC | Age: 70
End: 2025-03-10

## 2025-03-10 ENCOUNTER — WEB ENCOUNTER (OUTPATIENT)
Dept: URBAN - METROPOLITAN AREA CLINIC 54 | Facility: CLINIC | Age: 70
End: 2025-03-10

## 2025-04-13 ENCOUNTER — ERX REFILL RESPONSE (OUTPATIENT)
Dept: URBAN - NONMETROPOLITAN AREA CLINIC 4 | Facility: CLINIC | Age: 70
End: 2025-04-13

## 2025-04-13 RX ORDER — SPIRONOLACTONE 50 MG/1
1 TABLET ORALLY ONCE A DAY TABLET ORAL
Qty: 90 | Refills: 1

## 2025-06-27 ENCOUNTER — OFFICE VISIT (OUTPATIENT)
Dept: URBAN - METROPOLITAN AREA MEDICAL CENTER 24 | Facility: MEDICAL CENTER | Age: 70
End: 2025-06-27

## 2025-07-28 ENCOUNTER — OFFICE VISIT (OUTPATIENT)
Dept: URBAN - METROPOLITAN AREA CLINIC 54 | Facility: CLINIC | Age: 70
End: 2025-07-28

## 2025-08-05 ENCOUNTER — TELEPHONE ENCOUNTER (OUTPATIENT)
Dept: URBAN - METROPOLITAN AREA CLINIC 54 | Facility: CLINIC | Age: 70
End: 2025-08-05

## 2025-08-05 RX ORDER — FUROSEMIDE 20 MG/1
1 TABLET ORALLY ONCE A DAY TABLET ORAL
Qty: 90 | Refills: 1
End: 2026-02-01

## 2025-08-10 ENCOUNTER — ERX REFILL RESPONSE (OUTPATIENT)
Dept: URBAN - METROPOLITAN AREA CLINIC 54 | Facility: CLINIC | Age: 70
End: 2025-08-10

## 2025-08-10 RX ORDER — FUROSEMIDE 20 MG/1
TAKE 1 TABLET BY MOUTH EVERY DAY FOR 90 DAYS TABLET ORAL
Qty: 90 TABLET | Refills: 1